# Patient Record
Sex: MALE | Race: BLACK OR AFRICAN AMERICAN | NOT HISPANIC OR LATINO | Employment: FULL TIME | URBAN - METROPOLITAN AREA
[De-identification: names, ages, dates, MRNs, and addresses within clinical notes are randomized per-mention and may not be internally consistent; named-entity substitution may affect disease eponyms.]

---

## 2020-10-13 ENCOUNTER — HOSPITAL ENCOUNTER (OUTPATIENT)
Dept: CARDIOLOGY | Facility: MEDICAL CENTER | Age: 20
End: 2020-10-13
Attending: FAMILY MEDICINE
Payer: COMMERCIAL

## 2020-10-13 ENCOUNTER — HOSPITAL ENCOUNTER (OUTPATIENT)
Dept: LAB | Facility: MEDICAL CENTER | Age: 20
End: 2020-10-13
Attending: FAMILY MEDICINE
Payer: COMMERCIAL

## 2020-10-13 DIAGNOSIS — B33.22 COXSACKIE MYOCARDITIS: ICD-10-CM

## 2020-10-13 LAB
LV EJECT FRACT  99904: 60
LV EJECT FRACT MOD 2C 99903: 66.21
LV EJECT FRACT MOD 4C 99902: 58.39
LV EJECT FRACT MOD BP 99901: 61.44
TROPONIN T SERPL-MCNC: 8 NG/L (ref 6–19)

## 2020-10-13 PROCEDURE — 93306 TTE W/DOPPLER COMPLETE: CPT | Mod: 26 | Performed by: INTERNAL MEDICINE

## 2020-10-13 PROCEDURE — 84484 ASSAY OF TROPONIN QUANT: CPT

## 2020-10-13 PROCEDURE — 93306 TTE W/DOPPLER COMPLETE: CPT

## 2020-10-13 PROCEDURE — 36415 COLL VENOUS BLD VENIPUNCTURE: CPT

## 2022-06-08 DIAGNOSIS — E73.9 LACTOSE INTOLERANCE: Primary | ICD-10-CM

## 2022-06-08 NOTE — PROGRESS NOTES
Pt with lifelong lactose intolerance and noticed shakes provided by athletic department give him diarrhea. 3 months ago he started eating once daily, usually chicken and rice, with intermittent snacks. His goal was weight loss to meet goals of coaches, but he also wishes to become healthy. Prior diet described as unhealthy and high in junk food, but no stool problems. Now with 2-3 months of intermittent loose stools sometime causing him to miss practice secondary to GI discomfort. He is amenable to working with a nutritionist for his current eating schedule/nutritional issues. He denies using laxatives or other medicines for weight loss. He denies depression, anxiety, body dysmorphism, or SI/HI. He had 3 diarrheal episodes yesterday including one at practice including fecal incontinence. This AM he had diarrheal episodes about 10 times hourly for a few hours, but now resolved. He endorses good hydration. Denies hematochezia, mucus in stool, melena, nausea, vomiting. He endorses 14 lbs weight loss in the last month. Sometimes diarrheal episodes brought on during football practice. PE unremarkable today. His stool changes are likely related to lactose in the diet and dietary restrictions/changes initiated 3 months ago, but maybe some contribution from IBS and/or runner's trots. Plan to avoid dairy proteins and refer to nutritionist. Follow up with sports team after visit with nutrition. May participate in athletics as tolerated.    Serjio andrade/miracle Henley

## 2022-06-30 ENCOUNTER — TELEPHONE (OUTPATIENT)
Dept: INTERNAL MEDICINE | Facility: OTHER | Age: 22
End: 2022-06-30

## 2022-06-30 ENCOUNTER — TELEPHONE (OUTPATIENT)
Dept: INTERNAL MEDICINE | Facility: OTHER | Age: 22
End: 2022-06-30
Payer: COMMERCIAL

## 2022-08-09 ENCOUNTER — NON-PROVIDER VISIT (OUTPATIENT)
Dept: INTERNAL MEDICINE | Facility: OTHER | Age: 22
End: 2022-08-09
Payer: COMMERCIAL

## 2022-08-09 NOTE — PROGRESS NOTES
"Juan Carlos Oconnor is a 21 y.o. year old, male initial evaluation for weight management; present with     Incident of dizziness/nausea: lack of nourishment with self-managed weight loss efforts    Wellness Vision:  I want to work in a department in [place], being in shape- feel good; less weight    Dropped 30-40 lbs, strength increased    My Health Profile:    PHYSICAL ASSESSMENT  Current Height:  75\"  Ht Readings from Last 1 Encounters:   No data found for Ht     Current Weight:  258#  Wt Readings from Last 1 Encounters:   No data found for Wt     BMI:  32    Goal Weight:  250#  HABW: 315#    Total Body Water (lbs): 54.9#  Total Body Water (%): 54.9%  Visceral Fat: 7   (Range: Less than 13)  Total Body Fat: (lbs): 57#  % Body Fat: 22.1%   Muscle Mass (lbs): 191.2#  Muscle Mass (%): 74%  Fat-Free Mass: 201#  Resting Metabolic Rate: 2200  Maintenance Calories: 0907-6572    NUTRITION PHYSICAL ASSESSMENT:    FLUID INTAKE:  Water, 1.5 G + ORS  Tea, 1-glass minute maid berry juice    ACTIVITY REVIEW: Training in football   Current Exercise: 5801-7834 practice, 6/7; two days a week lifting [id number]   Sun/Mon off  Season Goal: maintain weight 265#    Patient Behaviors (indicate frequency)  Meal/Snack Pattern: one meal per day    1230 all food groups    Protein different day to day: chicken, beef  Fruit: 1 cup of fruit, p/a, blackberries  Veg: frozen, 2 cups   Carbs decreased- no time to count; 3-slices white bread/day  No cereals  Milk- lactose intolerant  Eggs- doesn't care to make  Legumes: no longer  No cooking    [facility] Facility:   Snack in am, lunch, dinner    Who lives in home: dorm on campus    PES: Athlete, 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    Allergies: lactose intolerance    Coached on healthy eating guidelines for athletic performance support   Optimize nutrition for safety, support protein requirements for FFM    Commitment made for new awareness in how he can make adjustments in his daily practices for desired outcome: maintain weight- no weight loss at this time per  and Juan Carlos's agreement      RTC x 2 months    Time Spent: 60 minutes

## 2022-08-09 NOTE — PATIENT INSTRUCTIONS
I will be more conscious of what I am eating  - diversify my diet  - aim for 150 grams of protein per day  - aim for 7253-1774 kcals per day  - 55% Carbs, 25% protein, 20 % fat

## 2022-08-10 VITALS — WEIGHT: 258 LBS | BODY MASS INDEX: 32.08 KG/M2 | HEIGHT: 75 IN

## 2022-10-10 ENCOUNTER — APPOINTMENT (OUTPATIENT)
Dept: RADIOLOGY | Facility: OTHER | Age: 22
End: 2022-10-10
Attending: FAMILY MEDICINE
Payer: COMMERCIAL

## 2022-10-10 DIAGNOSIS — R52 PAIN: ICD-10-CM

## 2022-10-10 PROCEDURE — 73030 X-RAY EXAM OF SHOULDER: CPT | Mod: TC,FY,LT | Performed by: FAMILY MEDICINE
